# Patient Record
Sex: MALE | URBAN - METROPOLITAN AREA
[De-identification: names, ages, dates, MRNs, and addresses within clinical notes are randomized per-mention and may not be internally consistent; named-entity substitution may affect disease eponyms.]

---

## 2019-07-18 ENCOUNTER — IMPORTED ENCOUNTER (OUTPATIENT)
Dept: URBAN - METROPOLITAN AREA CLINIC 50 | Facility: CLINIC | Age: 34
End: 2019-07-18

## 2020-01-09 NOTE — PATIENT DISCUSSION
Recent CT scan showed an aneurysm, do not suspect aneurysm is cause of blurred vision. Patient to keep scheduled appt with neurosurgeon.

## 2020-01-09 NOTE — PATIENT DISCUSSION
Visual changes are consistent with AMD changes. No evidence of fluid or hemorrhage on examination today. No significant change in vision today as compared to previous examination about 6 months ago.

## 2020-09-25 NOTE — PATIENT DISCUSSION
Visual changes are consistent with AMD changes. No evidence of fluid or hemorrhage on examination today. No significant change in vision today as compared to previous examination.